# Patient Record
Sex: FEMALE | Race: WHITE | URBAN - METROPOLITAN AREA
[De-identification: names, ages, dates, MRNs, and addresses within clinical notes are randomized per-mention and may not be internally consistent; named-entity substitution may affect disease eponyms.]

---

## 2017-04-21 ENCOUNTER — DOCTOR'S OFFICE (OUTPATIENT)
Dept: URBAN - METROPOLITAN AREA CLINIC 137 | Facility: CLINIC | Age: 42
Setting detail: OPHTHALMOLOGY
End: 2017-04-21
Payer: COMMERCIAL

## 2017-04-21 DIAGNOSIS — H04.123: ICD-10-CM

## 2017-04-21 DIAGNOSIS — H52.4: ICD-10-CM

## 2017-04-21 PROCEDURE — 92004 COMPRE OPH EXAM NEW PT 1/>: CPT | Performed by: OPHTHALMOLOGY

## 2017-04-21 ASSESSMENT — REFRACTION_MANIFEST
OD_VA1: 20/
OD_VA3: 20/
OD_VA3: 20/
OU_VA: 20/
OS_VA1: 20/
OS_VA3: 20/
OD_VA2: 20/
OD_VA2: 20/
OS_VA2: 20/
OD_VA1: 20/
OS_VA1: 20/
OS_VA2: 20/
OU_VA: 20/
OS_VA3: 20/

## 2017-04-21 ASSESSMENT — REFRACTION_OUTSIDERX
OS_VA3: 20/
OD_VA2: 20/20(J1+)
OD_SPHERE: +0.25
OD_ADD: +1.50
OS_SPHERE: +0.50
OD_CYLINDER: SPH
OS_VA2: 20/20(J1+)
OD_VA1: 20/20
OS_ADD: +1.50
OU_VA: 20/
OS_CYLINDER: SPH
OD_VA3: 20/
OS_VA1: 20/20

## 2017-04-21 ASSESSMENT — REFRACTION_CURRENTRX
OD_OVR_VA: 20/
OD_OVR_VA: 20/
OD_SPHERE: +1.50
OS_OVR_VA: 20/
OS_SPHERE: +1.50
OD_OVR_VA: 20/

## 2017-04-21 ASSESSMENT — SPHEQUIV_DERIVED: OD_SPHEQUIV: 0.25

## 2017-04-21 ASSESSMENT — CONFRONTATIONAL VISUAL FIELD TEST (CVF)
OD_FINDINGS: FULL
OS_FINDINGS: FULL

## 2017-04-21 ASSESSMENT — REFRACTION_AUTOREFRACTION
OD_CYLINDER: +0.50
OD_AXIS: 057
OS_SPHERE: +0.75
OD_SPHERE: 0.00

## 2017-04-21 ASSESSMENT — VISUAL ACUITY
OS_BCVA: 20/20-1
OD_BCVA: 20/20-1

## 2017-08-23 ENCOUNTER — OPTICAL OFFICE (OUTPATIENT)
Dept: URBAN - METROPOLITAN AREA CLINIC 146 | Facility: CLINIC | Age: 42
Setting detail: OPHTHALMOLOGY
End: 2017-08-23
Payer: COMMERCIAL

## 2017-08-23 DIAGNOSIS — H52.4: ICD-10-CM

## 2017-08-23 PROCEDURE — V2100 LENS SPHER SINGLE PLANO 4.00: HCPCS | Performed by: OPHTHALMOLOGY

## 2017-08-23 PROCEDURE — V2020 VISION SVCS FRAMES PURCHASES: HCPCS | Performed by: OPHTHALMOLOGY

## 2017-09-29 ENCOUNTER — GENERIC CONVERSION - ENCOUNTER (OUTPATIENT)
Dept: OTHER | Facility: OTHER | Age: 42
End: 2017-09-29

## 2017-10-02 ENCOUNTER — GENERIC CONVERSION - ENCOUNTER (OUTPATIENT)
Dept: OTHER | Facility: OTHER | Age: 42
End: 2017-10-02

## 2017-11-13 ENCOUNTER — GENERIC CONVERSION - ENCOUNTER (OUTPATIENT)
Dept: OTHER | Facility: OTHER | Age: 42
End: 2017-11-13

## 2017-11-21 ENCOUNTER — GENERIC CONVERSION - ENCOUNTER (OUTPATIENT)
Dept: OTHER | Facility: OTHER | Age: 42
End: 2017-11-21

## 2017-12-01 ENCOUNTER — GENERIC CONVERSION - ENCOUNTER (OUTPATIENT)
Dept: OTHER | Facility: OTHER | Age: 42
End: 2017-12-01

## 2018-01-12 NOTE — PROGRESS NOTES
Patient Health Assessment    Date:            09/29/2017  Blood Pressure:  118/72  Pulse:           59  Age:             42  Weight:          170 lbs  Height/Length:   6' 0"  Body Mass Index: 23 1  Provider:        ROSINA  [facility]: Herpes/Fever Blister  Medications: Allergies:      Codeine  Since Last Visit: Medical Alert: No Change    Medications: No Change    Allergies:        No Change  Pain Scale Type: Numeric Pain ScalePain Level: 0  Description:  37 yo female pt presents for comprehensive exam  Obtained PMH and FMX and  reviewed findings- PARL #10  Completed oral cancer screening- bilateral  mandibular batool, 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 above #14  Traced with GP to Mesial   root #14  Completed restorative charting  Caries- Class 5 #28, #30-B  Watch  #11 L  Pt treatment planned for #28 B, #30 B, RCT #10, #14, Crown #10  Spot  probing completed  No PD âº4, minimal BOP, gingiva is pink, stippled,  non-inflammed  Pt tx planned for prophy  Patient at the moment can't afford the RCT #10, #14  Discussed with patient  risks of waiting and possibility of infection persisting  Patient wanted to  go forward with prophy, cavities and approach RCTs after treatment  All  questions answered, patient left satisfied and ambulatory      NV: prophy  NNV: linette AGUILLON/ Dr Adrienne Ross    ----- Signed on Friday, September 29, 2017 at 12:41:11 PM  -----  ----- Provider: [id number] - [facility], Dentist -- Clinic: [facility]  -----

## 2018-01-13 NOTE — PROGRESS NOTES
Patient Health Assessment    Date:            11/13/2017  Blood Pressure:  121/68  Pulse:           65  Age:             42  Weight:          0 lbs  Height/Length:   0' 0"  Body Mass Index: 0 0  Provider:        ROSINA  Clinic:          [facility]: Herpes/Fever Blister  Medications: Allergies:      Codeine  Since Last Visit: Medical Alert: No Change    Medications: No Change    Allergies:        No Change  Pain Scale Type: Numeric Pain ScalePain Level: 0  Description:    39 yo female patient presents for concern regarding upper lip  Patient  describes it as the "mucosa feeling like there is a different texture to it"  No pain associated with it, started about 2 weeks ago  PMH reviewed, no  changes  Clinical exam- evaluated maxillary and mandibular mucosa- no lumps,  masses, changes in color of mucosa evaluated  Asked patient about recent  changes with her toothpaste or gum with cinnamon- patient was using a new  cinnamon gum  Requested that patient discontinue the use of the gum  Patient  has an appt on 12/4 and will follow up with her at that appointment regarding  maxillary lip  All questions answered   Patient left satisfied and ambulatory    NV: resins, follow up with patient regarding upper lip    HENNA/ Dr Raghav Avery    ----- Signed on Monday, November 13, 2017 at 12:01:40 PM  -----  ----- Provider: [email or web address] - [facility], Dentist -- [facility]: [facility]  -----

## 2018-01-17 NOTE — PROGRESS NOTES
Patient Health Assessment    Date:            10/02/2017  Blood Pressure:  0/0  Pulse:           0  Age:             42  Weight:          170 lbs  Height/Length:   6' 0"  Body Mass Index: 23 1  Provider:        [email or web address]  Clinic:          [facility]      Adult Prophy  Medical Alert: Herpes/Fever Blister  Medications: none  Allergies:      Codeine  Since Last Visit: Medical Alert: No Change    Medications: No Change    Allergies:        No Change  Pain Scale Type: Numeric Pain ScalePain Level: 0  Description: no dental pain or concerns  scaled, polished and flossed  gingiva appears healthy  very light buildup for  last cleaning 3 years ago   very minimal bleeding  nv= resins    ----- Signed on Monday, October 02, 2017 at 12:26:26 PM  -----  ----- Provider: [id number] Giovani Aleman R[facility] -- [facility]: [facility] -----

## 2018-01-23 NOTE — PROGRESS NOTES
Medical Alert: Herpes/Fever Blister  Medications: Amoxicillin 500mg    Motrin 600 mg  Allergies:      Codeine  Since Last Visit: Medical Alert: No Change    Medications: No Change    Allergies:        No Change  Pain Scale Type: Numeric Pain ScalePain Level: 0  Description:    Patient Health Assessment    Date:            12/01/2017  Blood Pressure:  112/72  Pulse:           81  Age:             42  Weight:          170 lbs  Height/Length:   6' 0"  Body Mass Index: 23 1  Provider:        RONNIE  [facility]      Pt presents for #28 and #30  PMH review, no changes  Applied topical  benzocaine, administered 2 carps 2% lido 1:100k epi via Lanette block  Prep with  245 carbide on high speed  Caries (presented as dark black soft dental caries ;   class V lesion) removed with round carbide on slow speed  Isolation with  cotton rolls and dri-angles  Etch with 37% H2PO4, rinse, dry  Applied Vividbond   with 15 second scrubx2, gentle air dry and light cured  Restored with  Beautifil flowable and Alpha II composite shade  A2 and light cured  Refined  with finishing burs, polished with enhance point  Verified occlusion and  contacts  Pt left satisfied and ambulatory  sent insurance RCT for #14 and #10  Patient presented an email from her doctor regarding diagnosis of lyme  disease which was treated using doxycycline already and does not meet the  criteria for a dental benefit exception letter  information that patient  presented with is scanned into document center   Patient was explained that we  will send in the authorization but there is a good chance that insurance will  not pay for this treatment and she will receive a denial     NV: palliative treatment for #10 (RCT access)  NNV : completion of RCT on #10 if patient agrees to pay or insurance approval       DEBBY    ----- Signed on Friday, December 01, 2017 at 9:43:48 AM  -----  ----- Provider: RONNIE - [facility], Dentist -- [facility]: [facility] -----

## 2018-11-26 ENCOUNTER — DOCTOR'S OFFICE (OUTPATIENT)
Dept: URBAN - METROPOLITAN AREA CLINIC 137 | Facility: CLINIC | Age: 43
Setting detail: OPHTHALMOLOGY
End: 2018-11-26
Payer: COMMERCIAL

## 2018-11-26 ENCOUNTER — OPTICAL OFFICE (OUTPATIENT)
Dept: URBAN - METROPOLITAN AREA CLINIC 146 | Facility: CLINIC | Age: 43
Setting detail: OPHTHALMOLOGY
End: 2018-11-26
Payer: COMMERCIAL

## 2018-11-26 DIAGNOSIS — H52.03: ICD-10-CM

## 2018-11-26 DIAGNOSIS — H52.4: ICD-10-CM

## 2018-11-26 PROCEDURE — 92015 DETERMINE REFRACTIVE STATE: CPT | Performed by: OPTOMETRIST

## 2018-11-26 PROCEDURE — V2100 LENS SPHER SINGLE PLANO 4.00: HCPCS | Performed by: OPTOMETRIST

## 2018-11-26 PROCEDURE — V2020 VISION SVCS FRAMES PURCHASES: HCPCS | Performed by: OPTOMETRIST

## 2018-11-26 PROCEDURE — 92014 COMPRE OPH EXAM EST PT 1/>: CPT | Performed by: OPTOMETRIST

## 2018-11-26 ASSESSMENT — REFRACTION_MANIFEST
OD_VA3: 20/
OS_VA3: 20/
OD_VA2: 20/20(J1+)
OU_VA: 20/
OS_CYLINDER: SPH
OD_CYLINDER: SPH
OS_VA1: 20/
OS_VA2: 20/
OS_CYLINDER: SPH
OD_VA1: 20/20
OD_ADD: +1.50
OD_SPHERE: +0.25
OS_VA1: 20/20
OS_SPHERE: +1.75
OD_VA1: 20/
OD_VA3: 20/
OD_CYLINDER: SPH
OS_VA3: 20/
OD_SPHERE: +1.75
OD_VA2: 20/
OS_VA2: 20/20(J1+)
OS_ADD: +1.50
OS_SPHERE: +0.50
OU_VA: 20/

## 2018-11-26 ASSESSMENT — REFRACTION_AUTOREFRACTION
OD_AXIS: 148
OS_CYLINDER: SPH
OS_SPHERE: +0.50
OD_SPHERE: 0.00

## 2018-11-26 ASSESSMENT — CONFRONTATIONAL VISUAL FIELD TEST (CVF)
OD_FINDINGS: FULL
OS_FINDINGS: FULL

## 2018-11-26 ASSESSMENT — VISUAL ACUITY
OD_BCVA: 20/20-2
OS_BCVA: 20/20-2

## 2018-11-26 ASSESSMENT — REFRACTION_CURRENTRX
OD_OVR_VA: 20/
OD_OVR_VA: 20/
OD_SPHERE: +1.50
OS_OVR_VA: 20/
OD_OVR_VA: 20/
OS_SPHERE: +1.50

## 2019-06-09 ENCOUNTER — HOSPITAL ENCOUNTER (EMERGENCY)
Facility: HOSPITAL | Age: 44
Discharge: HOME | End: 2019-06-09
Attending: EMERGENCY MEDICINE
Payer: COMMERCIAL

## 2019-06-09 VITALS
WEIGHT: 186.51 LBS | TEMPERATURE: 99.2 F | RESPIRATION RATE: 18 BRPM | HEART RATE: 62 BPM | DIASTOLIC BLOOD PRESSURE: 73 MMHG | BODY MASS INDEX: 25.3 KG/M2 | SYSTOLIC BLOOD PRESSURE: 137 MMHG | OXYGEN SATURATION: 99 %

## 2019-06-09 DIAGNOSIS — K08.89 PAIN, DENTAL: Primary | ICD-10-CM

## 2019-06-09 PROCEDURE — 99282 EMERGENCY DEPT VISIT SF MDM: CPT

## 2019-06-09 PROCEDURE — 99283 EMERGENCY DEPT VISIT LOW MDM: CPT | Performed by: EMERGENCY MEDICINE

## 2019-06-09 RX ORDER — PENICILLIN V POTASSIUM 500 MG/1
500 TABLET ORAL 4 TIMES DAILY
Qty: 28 TABLET | Refills: 0 | Status: SHIPPED | OUTPATIENT
Start: 2019-06-09 | End: 2019-06-16

## 2019-06-09 RX ORDER — CEPHALEXIN 500 MG/1
500 CAPSULE ORAL 4 TIMES DAILY
Qty: 40 CAPSULE | Refills: 0 | Status: SHIPPED | OUTPATIENT
Start: 2019-06-09 | End: 2019-06-09 | Stop reason: CLARIF

## 2019-06-09 RX ORDER — PENICILLIN V POTASSIUM 250 MG/1
500 TABLET ORAL ONCE
Status: COMPLETED | OUTPATIENT
Start: 2019-06-09 | End: 2019-06-09

## 2019-06-09 RX ADMIN — PENICILLIN V POTASSIUM 500 MG: 250 TABLET, FILM COATED ORAL at 23:34

## 2019-12-02 ENCOUNTER — DOCTOR'S OFFICE (OUTPATIENT)
Dept: URBAN - METROPOLITAN AREA CLINIC 137 | Facility: CLINIC | Age: 44
Setting detail: OPHTHALMOLOGY
End: 2019-12-02
Payer: COMMERCIAL

## 2019-12-02 DIAGNOSIS — H52.4: ICD-10-CM

## 2019-12-02 PROBLEM — H04.123 DRY EYE; BOTH EYES: Status: ACTIVE | Noted: 2017-04-21

## 2019-12-02 PROCEDURE — 92014 COMPRE OPH EXAM EST PT 1/>: CPT | Performed by: OPTOMETRIST

## 2019-12-02 PROCEDURE — 92015 DETERMINE REFRACTIVE STATE: CPT | Performed by: OPTOMETRIST

## 2019-12-02 ASSESSMENT — REFRACTION_CURRENTRX
OS_OVR_VA: 20/
OD_SPHERE: +1.50
OD_CYLINDER: -0.25
OS_OVR_VA: 20/
OD_OVR_VA: 20/
OS_CYLINDER: SPH
OD_OVR_VA: 20/
OD_OVR_VA: 20/
OS_SPHERE: +1.50
OS_OVR_VA: 20/
OD_AXIS: 177

## 2019-12-02 ASSESSMENT — REFRACTION_MANIFEST
OD_VA2: 20/
OS_VA1: 20/20
OS_VA3: 20/
OD_VA1: 20/20
OS_VA2: 20/
OU_VA: 20/
OS_VA3: 20/
OS_SPHERE: PLANO
OS_ADD: +1.75
OS_CYLINDER: SPH
OD_SPHERE: PLANO
OU_VA: 20/
OD_VA3: 20/
OS_VA2: 20/20(J1+)
OS_VA1: 20/
OD_VA3: 20/
OD_CYLINDER: SPH
OD_VA2: 20/20(J1+)
OD_VA1: 20/
OD_ADD: +1.75

## 2019-12-02 ASSESSMENT — REFRACTION_AUTOREFRACTION
OD_CYLINDER: SPHERE
OS_SPHERE: +0.50
OD_SPHERE: +0.25
OS_CYLINDER: SPH

## 2019-12-02 ASSESSMENT — CONFRONTATIONAL VISUAL FIELD TEST (CVF)
OS_FINDINGS: FULL
OD_FINDINGS: FULL

## 2019-12-02 ASSESSMENT — VISUAL ACUITY
OD_BCVA: 20/20-3
OS_BCVA: 20/20-2

## 2022-02-06 NOTE — PROGRESS NOTES
Patient Health Assessment    Date:            11/21/2017  Blood Pressure:  115/49  Pulse:           52  Age:             42  Weight:          170 lbs  Height/Length:   6' 0"  Body Mass Index: 23 1  Provider:        [id number]  [facility]: Herpes/Fever Blister  Medications: Allergies:      Codeine  Since Last Visit: Medical Alert: No Change    Medications: No Change    Allergies:        No Change  Pain Scale Type: Numeric Pain ScalePain Level: 8  Description:      S: Pt presented as emergency  Patient states her upper left second molar  hurts really bad  Pt pointing at #14  She said the pain started yesterday and  worst last night throbbing and hasn't slept  She took advil and applied a  topical cream to ease the pain but its not working  The side of her face as  well hurts  She states that her face felt swollen  O: Took PA and #14 abscessed  Existing crown  No EO/IO swelling present  A: #14 abscessed tooth    P: Gave pt option to either save the tooth with root canal, post and core,  crown or extraction  Explained that we would try to have the root canal done  through the crown but if not possible then tooth would need a new crown  Pt  said she wanted to think about it and also said that she might look at other  places where she can have it done  Gave pt fees  All questions were answered  Prescribed Amoxicillin and Motrin  Advised pt to call us back and let us know  what she decides to do  Pt left in good health  NV: pt will call and let us know what she wants to do    FERNY Segal    ----- Signed on Tuesday, November 21, 2017 at 2:14:50 PM  -----  ----- Provider: Orin Alan Dentist -- Clinic: [facility] -----
No